# Patient Record
Sex: FEMALE | Race: OTHER | Employment: OTHER | URBAN - METROPOLITAN AREA
[De-identification: names, ages, dates, MRNs, and addresses within clinical notes are randomized per-mention and may not be internally consistent; named-entity substitution may affect disease eponyms.]

---

## 2018-07-12 ENCOUNTER — NEW PATIENT COMPREHENSIVE (OUTPATIENT)
Dept: URBAN - METROPOLITAN AREA CLINIC 47 | Facility: CLINIC | Age: 28
End: 2018-07-12

## 2018-07-12 DIAGNOSIS — H52.202: ICD-10-CM

## 2018-07-12 DIAGNOSIS — H52.03: ICD-10-CM

## 2018-07-12 DIAGNOSIS — H26.063: ICD-10-CM

## 2018-07-12 PROCEDURE — 92015 DETERMINE REFRACTIVE STATE: CPT

## 2018-07-12 PROCEDURE — 92004 COMPRE OPH EXAM NEW PT 1/>: CPT

## 2018-07-12 ASSESSMENT — VISUAL ACUITY
OS_SC: J1
OS_SC: 20/25-2
OD_SC: 20/20-1
OD_SC: J1

## 2018-07-12 ASSESSMENT — KERATOMETRY
OS_AXISANGLE_DEGREES: 140
OD_K2POWER_DIOPTERS: 40.12
OD_AXISANGLE2_DEGREES: 60
OD_K1POWER_DIOPTERS: 39.87
OS_K2POWER_DIOPTERS: 40.00
OD_AXISANGLE_DEGREES: 150
OS_K1POWER_DIOPTERS: 40.50
OS_AXISANGLE2_DEGREES: 50

## 2018-07-12 ASSESSMENT — TONOMETRY
OS_IOP_MMHG: 15
OD_IOP_MMHG: 14

## 2018-12-20 NOTE — PATIENT DISCUSSION
New Prescription: ofloxacin (ofloxacin): drops: 0.3% 1 drop four times a day as directed into affected eye 12-

## 2018-12-20 NOTE — PATIENT DISCUSSION
New Prescription: prednisolone acetate (prednisolone acetate): drops,suspension: 1% 1 drop four times a day as directed into affected eye 12-

## 2018-12-20 NOTE — PATIENT DISCUSSION
New Prescription: ketorolac (ketorolac): drops: 0.4% 1 drop four times a day as directed into affected eye 12-

## 2018-12-20 NOTE — PATIENT DISCUSSION
LEFT EYE CATARACT SURGERY - PRE-OP INSTRUCTIONS:  I have reviewed the indications, alternatives, and risks of the procedure with the patient. These risks include partial or total loss of vision, infection in the eye, pain, and the need for additional surgery for complications including a broken posterior capsule, inability to completely remove the cataract, dropped nucleus, infection, or retinal detachment. The patient has given informed consent by signing the standard consent form. We will proceed with cataract surgery as planned. The patient has also been instructed in the usual pre-operative regimen including the need to use Prolensa and Besivance 3 days prior to surgery or the alternatives of Ketorolac and Ofloxacin, and the need to avoid eating or drinking anything except prescription medications after midnight on the morning of surgery. The patient was given the pre-printed Cataract Surgery Instructions handout. The contents were carefully reviewed with the patient.

## 2018-12-20 NOTE — PATIENT DISCUSSION
Surgery Counseling:  I have discussed the option of glasses versus cataract surgery versus following, It was explained that when vision no longer meets the patient's visual needs and a new prescription for glasses is not likely to improve the patient's visual symptoms, the option of cataract surgery is a reasonable next step. It was explained that there is no guarantee that removing the cataract will improve their visual symptoms; however, it is believed that the cataract is contributing to the patient's visual impairment and surgery may noticeably improve both the visual and functional status of the patient. After this discussion, the patient desires to proceed with cataract surgery with implantation of an intraocular lens to improve their vision for DISTANCE/POSSIBLE MINI MONO. I have prescribed Prolensa, Besivance, and Durezol to use as directed. Approved substitution of generics are Ofloxacin, Ketorolac, and Prednisolone.

## 2019-02-06 NOTE — PATIENT DISCUSSION
FUCH'S ENDOTHELIAL DYSTROPHY,OD:  EDNOTHELIAL CELL COUNT PERFORMED TODAY. IT IS BELIEVED THE RESULTS ARE THE BEST THE TECH WAS ABLE TO ACHIEVE. PATIENT WILL RECEIVE RESULTS AT THEIR NEXT SCHEDULED APPOINTMENT.

## 2019-02-06 NOTE — PATIENT DISCUSSION
Continue: ketorolac (ketorolac): drops: 0.4% 1 drop four times a day as directed into affected eye 12-

## 2019-02-06 NOTE — PATIENT DISCUSSION
Continue: ofloxacin (ofloxacin): drops: 0.3% 1 drop four times a day as directed into affected eye 12-

## 2019-02-06 NOTE — PATIENT DISCUSSION
Continue: prednisolone acetate (prednisolone acetate): drops,suspension: 1% 1 drop four times a day as directed into affected eye 12-

## 2019-02-12 NOTE — PATIENT DISCUSSION
Co-managed Patient Counseling: This patient is shared in care with Dr. Mamadou Oneil and Dr. Cheyenne Malik. It has been explained to the patient and understood that they will see Dr. Mamadou Oneil for Phaco IOL OS procedure and their one day post-op following. The patient will then return to their original OD/MD. The patient is to continue any drops and directions given by Dr. Mamadou Oneil unless otherwise directed. Patient is to return to Dr. Mamadou Oneil when receiving their next scheduled procedure or pre-op.

## 2019-02-26 NOTE — PATIENT DISCUSSION
Co-managed Patient Counseling: This patient is shared in care with Dr. Pallavi Vicente and Dr. Eric Ledbetter. It has been explained to the patient and understood that they will see Dr. Pallavi Vicente for cataract extraction procedure and their one day post-op following. The patient will then return to their original OD/MD. The patient is to continue any drops and directions given by Dr. Pallavi Vicente unless otherwise directed.

## 2019-02-26 NOTE — PATIENT DISCUSSION
increase Prednisolone to 6x day until next visit OD &amp; follow in 1 week with Dr. Angela Mcwilliams due to edema OD. Will then return to Dr. Robi Moeller for care.

## 2019-03-05 NOTE — PATIENT DISCUSSION
continue drops as instructed OS-- stay on Pred QD even after instructions say to stop, due to [facility]

## 2020-03-03 NOTE — PATIENT DISCUSSION
Continue: Harmeet 128 (sodium chloride): ointment: 5% a small amount every night as directed into both eyes

## 2020-03-03 NOTE — PATIENT DISCUSSION
FUCH'S ENDOTHELIAL DYSTROPHY OU:  MILD GUTTATA AND NO EDEMA. USE ALEXANDRO OINTMENT OR DROPS AS DIRECTED. FOLLOW.

## 2020-03-03 NOTE — PATIENT DISCUSSION
Continue: Retaine MGD (PF) (light mineral oil-min oil (pf)): dropperette: 0.5-0.5% once a day as needed into both eyes

## 2020-07-22 ENCOUNTER — ESTABLISHED COMPREHENSIVE EXAM (OUTPATIENT)
Dept: URBAN - METROPOLITAN AREA CLINIC 47 | Facility: CLINIC | Age: 30
End: 2020-07-22

## 2020-07-22 DIAGNOSIS — H52.202: ICD-10-CM

## 2020-07-22 DIAGNOSIS — H26.063: ICD-10-CM

## 2020-07-22 DIAGNOSIS — H04.123: ICD-10-CM

## 2020-07-22 DIAGNOSIS — H52.03: ICD-10-CM

## 2020-07-22 PROCEDURE — 92014 COMPRE OPH EXAM EST PT 1/>: CPT

## 2020-07-22 PROCEDURE — 92015 DETERMINE REFRACTIVE STATE: CPT

## 2020-07-22 ASSESSMENT — VISUAL ACUITY
OD_SC: 20/30
OD_CC: 20/25-2
OS_SC: J1
OS_CC: 20/25-2
OD_SC: J1-
OS_CC: J1
OD_CC: J1
OS_SC: 20/30

## 2020-07-22 ASSESSMENT — KERATOMETRY
OS_AXISANGLE2_DEGREES: 50
OS_K2POWER_DIOPTERS: 40.00
OS_K1POWER_DIOPTERS: 40.50
OD_K2POWER_DIOPTERS: 40.12
OD_AXISANGLE2_DEGREES: 60
OD_AXISANGLE_DEGREES: 150
OD_K1POWER_DIOPTERS: 39.87
OS_AXISANGLE_DEGREES: 140

## 2020-07-22 ASSESSMENT — TONOMETRY
OS_IOP_MMHG: 14
OD_IOP_MMHG: 14

## 2020-10-27 ENCOUNTER — APPOINTMENT (RX ONLY)
Dept: URBAN - METROPOLITAN AREA CLINIC 153 | Facility: CLINIC | Age: 30
Setting detail: DERMATOLOGY
End: 2020-10-27

## 2020-10-27 DIAGNOSIS — Z41.9 ENCOUNTER FOR PROCEDURE FOR PURPOSES OTHER THAN REMEDYING HEALTH STATE, UNSPECIFIED: ICD-10-CM

## 2020-10-27 PROCEDURE — ? COSMETIC CONSULTATION: HAIR REMOVAL

## 2020-10-27 ASSESSMENT — LOCATION ZONE DERM
LOCATION ZONE: LIP
LOCATION ZONE: FACE

## 2020-10-27 ASSESSMENT — LOCATION DETAILED DESCRIPTION DERM
LOCATION DETAILED: RIGHT CHIN
LOCATION DETAILED: PHILTRUM

## 2020-10-27 ASSESSMENT — LOCATION SIMPLE DESCRIPTION DERM
LOCATION SIMPLE: CHIN
LOCATION SIMPLE: UPPER LIP

## 2020-10-27 NOTE — HPI: COSMETIC (LASER HAIR REMOVAL)
Eye Color: brown
Have You Had Laser Hair Removal Before?: has not had previous treatment
When Outside In The Sun, Do You...: mildly burns, tans slowly

## 2020-11-05 ENCOUNTER — APPOINTMENT (RX ONLY)
Dept: URBAN - METROPOLITAN AREA CLINIC 153 | Facility: CLINIC | Age: 30
Setting detail: DERMATOLOGY
End: 2020-11-05

## 2020-11-05 DIAGNOSIS — Z41.9 ENCOUNTER FOR PROCEDURE FOR PURPOSES OTHER THAN REMEDYING HEALTH STATE, UNSPECIFIED: ICD-10-CM

## 2020-11-05 PROCEDURE — ? GENTLELASE

## 2020-11-05 ASSESSMENT — LOCATION DETAILED DESCRIPTION DERM
LOCATION DETAILED: PHILTRUM
LOCATION DETAILED: RIGHT CHIN

## 2020-11-05 ASSESSMENT — LOCATION ZONE DERM
LOCATION ZONE: LIP
LOCATION ZONE: FACE

## 2020-11-05 ASSESSMENT — LOCATION SIMPLE DESCRIPTION DERM
LOCATION SIMPLE: UPPER LIP
LOCATION SIMPLE: CHIN

## 2020-11-05 NOTE — PROCEDURE: GENTLELASE
Fluence: 12
Consent: Written consent obtained, risks reviewed including but not limited to crusting, scabbing, blistering, scarring, darker or lighter pigmentary change, paradoxical hair regrowth, incomplete removal of hair and infection.
Indication: Laser Hair Removal
Laser Type: Alexandrite 755nm
Endpoint: Lentigines: Immediate endpoint: perilesional erythema and edema. Vaseline and ice applied. Post care reviewed with patient.
Pulse Duration: 0.25 ms
Detail Level: Simple
External Cooling Fan Speed: 0
Cooling: DCD setting
Post-Care Instructions: I reviewed with the patient in detail post-care instructions. Patient should avoid sun for a minimum of 4 weeks before and after treatment.
Endpoint: Laser Hair Removal: Immediate endpoint: perifollicular erythema and edema. Vaseline and ice applied. Post care reviewed with patient.
Price (Use Numbers Only, No Special Characters Or $): 150.0
Spot Size: 12 mm

## 2020-12-08 NOTE — PATIENT DISCUSSION
Continue: Retaine MGD (PF) (light mineral oil-min oil (pf)): dropperette: 0.5-0.5% once a day as needed into both eyes Respiratory

## 2021-02-01 ENCOUNTER — APPOINTMENT (RX ONLY)
Dept: URBAN - METROPOLITAN AREA CLINIC 153 | Facility: CLINIC | Age: 31
Setting detail: DERMATOLOGY
End: 2021-02-01

## 2021-02-01 DIAGNOSIS — Z41.9 ENCOUNTER FOR PROCEDURE FOR PURPOSES OTHER THAN REMEDYING HEALTH STATE, UNSPECIFIED: ICD-10-CM

## 2021-02-01 PROCEDURE — ? GENTLELASE

## 2021-02-01 ASSESSMENT — LOCATION SIMPLE DESCRIPTION DERM
LOCATION SIMPLE: CHIN
LOCATION SIMPLE: RIGHT LIP

## 2021-02-01 ASSESSMENT — LOCATION DETAILED DESCRIPTION DERM
LOCATION DETAILED: RIGHT UPPER CUTANEOUS LIP
LOCATION DETAILED: LEFT CHIN
LOCATION DETAILED: RIGHT CHIN

## 2021-02-01 ASSESSMENT — LOCATION ZONE DERM
LOCATION ZONE: FACE
LOCATION ZONE: LIP

## 2021-02-01 NOTE — PROCEDURE: GENTLELASE
Endpoint: Seborrheic Keratoses: Immediate endpoint: perilesional erythema and edema. Vaseline and ice applied. Post care reviewed with patient.
Endpoint: Laser Hair Removal: Immediate endpoint: perifollicular erythema and edema. Vaseline and ice applied. Post care reviewed with patient.
Laser Type: Alexandrite 755nm
Indication: Laser Hair Removal
Fluence: 12
Spot Size: 12 mm
Pulse Duration: 0.25 ms
Cooling: DCD setting
Detail Level: Simple
Consent: Written consent obtained, risks reviewed including but not limited to crusting, scabbing, blistering, scarring, darker or lighter pigmentary change, paradoxical hair regrowth, incomplete removal of hair and infection.
Post-Care Instructions: I reviewed with the patient in detail post-care instructions. Patient should avoid sun for a minimum of 4 weeks before and after treatment.
External Cooling Fan Speed: 0

## 2021-02-01 NOTE — HPI: COSMETIC (LASER HAIR REMOVAL)
Have You Had Laser Hair Removal Before?: has had previous treatment
When Was Your Last Laser Treatment?: 11/5/2020

## 2021-03-01 ENCOUNTER — APPOINTMENT (RX ONLY)
Dept: URBAN - METROPOLITAN AREA CLINIC 153 | Facility: CLINIC | Age: 31
Setting detail: DERMATOLOGY
End: 2021-03-01

## 2021-03-01 DIAGNOSIS — Z41.9 ENCOUNTER FOR PROCEDURE FOR PURPOSES OTHER THAN REMEDYING HEALTH STATE, UNSPECIFIED: ICD-10-CM

## 2021-03-01 PROCEDURE — ? GENTLELASE

## 2021-03-01 ASSESSMENT — LOCATION SIMPLE DESCRIPTION DERM
LOCATION SIMPLE: RIGHT LIP
LOCATION SIMPLE: CHIN

## 2021-03-01 ASSESSMENT — LOCATION ZONE DERM
LOCATION ZONE: LIP
LOCATION ZONE: FACE

## 2021-03-01 NOTE — PROCEDURE: GENTLELASE
Endpoint: Seborrheic Keratoses: Immediate endpoint: perilesional erythema and edema. Vaseline and ice applied. Post care reviewed with patient.
Endpoint: Laser Hair Removal: Immediate endpoint: perifollicular erythema and edema. Vaseline and ice applied. Post care reviewed with patient.
Laser Type: Alexandrite 755nm
Indication: Laser Hair Removal
Fluence: 12
Spot Size: 12 mm
Price (Use Numbers Only, No Special Characters Or $): 150.00
Pulse Duration: 0.25 ms
Cooling: DCD setting
Detail Level: Simple
Consent: Written consent obtained, risks reviewed including but not limited to crusting, scabbing, blistering, scarring, darker or lighter pigmentary change, paradoxical hair regrowth, incomplete removal of hair and infection.
Post-Care Instructions: I reviewed with the patient in detail post-care instructions. Patient should avoid sun for a minimum of 4 weeks before and after treatment.
External Cooling Fan Speed: 0

## 2021-03-31 ENCOUNTER — APPOINTMENT (RX ONLY)
Dept: URBAN - METROPOLITAN AREA CLINIC 153 | Facility: CLINIC | Age: 31
Setting detail: DERMATOLOGY
End: 2021-03-31

## 2021-03-31 DIAGNOSIS — Z41.9 ENCOUNTER FOR PROCEDURE FOR PURPOSES OTHER THAN REMEDYING HEALTH STATE, UNSPECIFIED: ICD-10-CM

## 2021-03-31 PROCEDURE — ? GENTLELASE

## 2021-03-31 ASSESSMENT — LOCATION ZONE DERM
LOCATION ZONE: FACE
LOCATION ZONE: LIP

## 2021-03-31 ASSESSMENT — LOCATION DETAILED DESCRIPTION DERM
LOCATION DETAILED: PHILTRUM
LOCATION DETAILED: RIGHT CHIN

## 2021-03-31 ASSESSMENT — LOCATION SIMPLE DESCRIPTION DERM
LOCATION SIMPLE: CHIN
LOCATION SIMPLE: UPPER LIP

## 2021-03-31 NOTE — PROCEDURE: GENTLELASE
Fluence: 12
Endpoint: Lentigines: Immediate endpoint: perilesional erythema and edema. Vaseline and ice applied. Post care reviewed with patient.
Spot Size: 12 mm
Consent: Written consent obtained, risks reviewed including but not limited to crusting, scabbing, blistering, scarring, darker or lighter pigmentary change, paradoxical hair regrowth, incomplete removal of hair and infection.
Detail Level: Simple
Indication: Laser Hair Removal
Endpoint: Laser Hair Removal: Immediate endpoint: perifollicular erythema and edema. Vaseline and ice applied. Post care reviewed with patient.
Post-Care Instructions: I reviewed with the patient in detail post-care instructions. Patient should avoid sun for a minimum of 4 weeks before and after treatment.
Laser Type: Alexandrite 755nm
External Cooling Fan Speed: 0
Pulse Duration: 0.25 ms
Cooling: DCD setting

## 2021-03-31 NOTE — HPI: COSMETIC (LASER HAIR REMOVAL)
Have You Had Laser Hair Removal Before?: has had previous treatment
When Was Your Last Laser Treatment?: 03/012021
Number Of Treatments: 3

## 2021-04-28 ENCOUNTER — APPOINTMENT (RX ONLY)
Dept: URBAN - METROPOLITAN AREA CLINIC 153 | Facility: CLINIC | Age: 31
Setting detail: DERMATOLOGY
End: 2021-04-28

## 2021-04-28 DIAGNOSIS — Z41.9 ENCOUNTER FOR PROCEDURE FOR PURPOSES OTHER THAN REMEDYING HEALTH STATE, UNSPECIFIED: ICD-10-CM

## 2021-04-28 PROCEDURE — ? GENTLELASE

## 2021-04-28 ASSESSMENT — LOCATION DETAILED DESCRIPTION DERM
LOCATION DETAILED: RIGHT CHIN
LOCATION DETAILED: PHILTRUM

## 2021-04-28 ASSESSMENT — LOCATION ZONE DERM
LOCATION ZONE: FACE
LOCATION ZONE: LIP

## 2021-04-28 ASSESSMENT — LOCATION SIMPLE DESCRIPTION DERM
LOCATION SIMPLE: CHIN
LOCATION SIMPLE: UPPER LIP

## 2021-04-28 NOTE — HPI: COSMETIC (LASER HAIR REMOVAL)
Have You Had Laser Hair Removal Before?: has had previous treatment
When Was Your Last Laser Treatment?: 03/31/2021
Number Of Treatments: 4

## 2021-04-28 NOTE — PROCEDURE: GENTLELASE
Post-Care Instructions: I reviewed with the patient in detail post-care instructions. Patient should avoid sun for a minimum of 4 weeks before and after treatment.
Cooling: DCD setting
Detail Level: Simple
Indication: Laser Hair Removal
External Cooling Fan Speed: 0
Endpoint: Seborrheic Keratoses: Immediate endpoint: perilesional erythema and edema. Vaseline and ice applied. Post care reviewed with patient.
Endpoint: Laser Hair Removal: Immediate endpoint: perifollicular erythema and edema. Vaseline and ice applied. Post care reviewed with patient.
Fluence: 12
Spot Size: 12 mm
Pulse Duration: 0.25 ms
Laser Type: Alexandrite 755nm
Consent: Written consent obtained, risks reviewed including but not limited to crusting, scabbing, blistering, scarring, darker or lighter pigmentary change, paradoxical hair regrowth, incomplete removal of hair and infection.

## 2021-05-24 ENCOUNTER — APPOINTMENT (RX ONLY)
Dept: URBAN - METROPOLITAN AREA CLINIC 153 | Facility: CLINIC | Age: 31
Setting detail: DERMATOLOGY
End: 2021-05-24

## 2021-05-24 DIAGNOSIS — Z41.9 ENCOUNTER FOR PROCEDURE FOR PURPOSES OTHER THAN REMEDYING HEALTH STATE, UNSPECIFIED: ICD-10-CM

## 2021-05-24 PROCEDURE — ? GENTLELASE

## 2021-05-24 ASSESSMENT — LOCATION DETAILED DESCRIPTION DERM
LOCATION DETAILED: PHILTRUM
LOCATION DETAILED: RIGHT CHIN

## 2021-05-24 ASSESSMENT — LOCATION SIMPLE DESCRIPTION DERM
LOCATION SIMPLE: UPPER LIP
LOCATION SIMPLE: CHIN

## 2021-05-24 ASSESSMENT — LOCATION ZONE DERM
LOCATION ZONE: FACE
LOCATION ZONE: LIP

## 2021-05-24 NOTE — PROCEDURE: GENTLELASE
Post-Care Instructions: I reviewed with the patient in detail post-care instructions. Patient should avoid sun for a minimum of 4 weeks before and after treatment.
External Cooling Fan Speed: 0
Cooling: DCD setting
Indication: Laser Hair Removal
Detail Level: Simple
Endpoint: Laser Hair Removal: Immediate endpoint: perifollicular erythema and edema. Vaseline and ice applied. Post care reviewed with patient.
Endpoint: Seborrheic Keratoses: Immediate endpoint: perilesional erythema and edema. Vaseline and ice applied. Post care reviewed with patient.
Fluence: 12
Spot Size: 12 mm
Pulse Duration: 1.5 ms
Consent: Written consent obtained, risks reviewed including but not limited to crusting, scabbing, blistering, scarring, darker or lighter pigmentary change, paradoxical hair regrowth, incomplete removal of hair and infection.
Laser Type: Alexandrite 755nm

## 2021-05-24 NOTE — HPI: COSMETIC (LASER HAIR REMOVAL)
Have You Had Laser Hair Removal Before?: has had previous treatment
When Was Your Last Laser Treatment?: 04/28/2021
Number Of Treatments: 5
Additional History: Patient is stacy today for her 6th treatment.

## 2021-09-27 ENCOUNTER — APPOINTMENT (RX ONLY)
Dept: URBAN - METROPOLITAN AREA CLINIC 153 | Facility: CLINIC | Age: 31
Setting detail: DERMATOLOGY
End: 2021-09-27

## 2021-09-27 DIAGNOSIS — Z41.9 ENCOUNTER FOR PROCEDURE FOR PURPOSES OTHER THAN REMEDYING HEALTH STATE, UNSPECIFIED: ICD-10-CM

## 2021-09-27 PROCEDURE — ? GENTLELASE

## 2021-09-27 ASSESSMENT — LOCATION DETAILED DESCRIPTION DERM: LOCATION DETAILED: RIGHT CHIN

## 2021-09-27 ASSESSMENT — LOCATION SIMPLE DESCRIPTION DERM: LOCATION SIMPLE: CHIN

## 2021-09-27 ASSESSMENT — LOCATION ZONE DERM: LOCATION ZONE: FACE

## 2022-02-07 NOTE — PATIENT DISCUSSION
Due to trauma. No treatment indicated. Reassurance given. Start Erythromycin jose antonio QHS for one week, sent rx. Hold Harmeet ointment OS, until redness is gone. Continue artificial tears for foreign body sensation. No abrasion noted.

## 2022-02-07 NOTE — PATIENT DISCUSSION
Continue: Harmeet 128 (sodium chloride): ointment: 5% a small amount every night as directed into both eyes.

## 2022-02-07 NOTE — PATIENT DISCUSSION
New Prescription: erythromycin (erythromycin): ointment: 5 mg/gram (0.5 %) a small amount once a day as directed into left eye 01-.

## 2022-02-07 NOTE — PATIENT DISCUSSION
Continue: Retaine MGD (PF) (light mineral oil-min oil (pf)): dropperette: 0.5-0.5% once a day as needed into both eyes.

## 2022-07-18 NOTE — PATIENT DISCUSSION
Medications:. Wartpeel Counseling:  I discussed with the patient the risks of Wartpeel including but not limited to erythema, scaling, itching, weeping, crusting, and pain.

## 2022-09-21 ASSESSMENT — KERATOMETRY
OD_AXISANGLE2_DEGREES: 60
OD_K2POWER_DIOPTERS: 40.12
OS_K1POWER_DIOPTERS: 40.50
OD_K1POWER_DIOPTERS: 39.87
OD_AXISANGLE_DEGREES: 150
OS_AXISANGLE_DEGREES: 140
OS_K2POWER_DIOPTERS: 40.00
OS_AXISANGLE2_DEGREES: 50

## 2022-09-22 ENCOUNTER — COMPREHENSIVE EXAM (OUTPATIENT)
Dept: URBAN - METROPOLITAN AREA CLINIC 47 | Facility: CLINIC | Age: 32
End: 2022-09-22

## 2022-09-22 DIAGNOSIS — H26.063: ICD-10-CM

## 2022-09-22 DIAGNOSIS — Z98.890: ICD-10-CM

## 2022-09-22 DIAGNOSIS — H04.123: ICD-10-CM

## 2022-09-22 DIAGNOSIS — H52.202: ICD-10-CM

## 2022-09-22 DIAGNOSIS — H52.03: ICD-10-CM

## 2022-09-22 PROCEDURE — 92015 DETERMINE REFRACTIVE STATE: CPT

## 2022-09-22 PROCEDURE — 92014 COMPRE OPH EXAM EST PT 1/>: CPT

## 2022-09-22 ASSESSMENT — VISUAL ACUITY
OD_SC: 20/20
OD_SC: J1
OS_SC: J1
OS_SC: 20/25

## 2022-09-22 ASSESSMENT — TONOMETRY
OD_IOP_MMHG: 16
OS_IOP_MMHG: 16